# Patient Record
(demographics unavailable — no encounter records)

---

## 2024-10-10 NOTE — DATA REVIEWED
[MRI] : MRI [Lumbar Spine] : lumbar spine [Report was reviewed and noted in the chart] : The report was reviewed and noted in the chart [I independently reviewed and interpreted images and report] : I independently reviewed and interpreted images and report [FreeTextEntry1] : [facility] L Spine MRI 4/16/24 1. L5-S1 broad-based posterior disc extrusion with caudal extension contacting b/l S1, mild central stenosis and moderate foraminal stenosis.

## 2024-10-10 NOTE — IMAGING
[Scoliosis] : Scoliosis [No spinal deformity, fracture, lytic lesion, or marked single level collapse] : No spinal deformity, fracture, lytic lesion, or marked single level collapse [No instability seen on flexion/extension] : No instability seen on flexion/extension [AP] : anteroposterior [Mild arthritis (Tonnis Grade 1)] : Mild arthritis (Tonnis Grade 1) [de-identified] : L Spine Inspection: No defects or deformities Palpation: L lumbar spasms and tenderness ROM: stiffness, diminished in all planes - pain with flexion Strength: 5/5 b/l hip flexors, knee extensors, ankle dorsiflexors, EHL, ankle plantarflexors Neuro: Sensation LT I Negative b/l SLR Toe and heal walk intact Gait non antalgic L S1 dermatome of pain

## 2024-10-10 NOTE — ASSESSMENT
[FreeTextEntry1] : 26 y/o M with chronic low back pain and persistent left buttock and thigh pain. Hx of RA. MRI shows a left sided herniated disc contacting b/l S1 at L5-S1 and degenerative disc changes at L3-4,4-5. HE would like to continue with weight loss prior to pursuing surgery which we support. Encouraged keeping his core strong. We'll reinitiate PT for new HEP. Continue pain meds prn which are renewed today.   - If continued conservative measure are insufficient would plan for a discectomy at L5-S1. Do continue to believe L5-S1 is the main pain generator. f/u 6-8 weeks  The risks and benefits of surgery have been discussed. Risks include but are not limited to bleeding, infection, reaction to anesthesia, injury to blood vessels and nerves, malunion, nonunion, DVT, PE, necessity of repeat surgery, CSF leak/repair, chronic pain, loss of limb and death. The patient understands the risks and agrees with the surgical plan. All questions have been answered.

## 2024-10-10 NOTE — HISTORY OF PRESENT ILLNESS
[Lower back] : lower back [8] : 8 [Dull/Aching] : dull/aching [Radiating] : radiating [Constant] : constant [Nothing helps with pain getting better] : Nothing helps with pain getting better [de-identified] : 10/10/24: f/u lbp. continues with some L leg pain. Has been working on weight loss started wegovy. spasms and tightness in the back. taking hydrocodone prn   8/29/24: Follow up L Spine. Here to discuss surgical options.   8/8/24: continues with pain. here with new MRI  7/18/24: Follow Up- L Spine. Pain has gotten worse since LESI w/Carmen. Referred back here. Now pain is radiating down RLE to thigh. D/c PT due to worsening symptoms, completed 2 weeks.    5/23/24: Follow Up- L Spine. Gotten worse w/PT. Radiating down left buttocks to posterior thigh. Taking Tramadol, helps until effect wears off.   4/30/24: Follow up L Spine. Pain meds have been helping. Started PT yesterday. Pain continues to radiate into bl legs L>R. MRI review.   4/11/24: 26 yo M presenting today with chronic low back pain that started in adolescence, which has been progressively worsening since January. Reports symptoms worsen with bending and reaching motion aggravating left side of back causing tightness. Taking Robaxin and ibuprofen. Patient reports he went to ED yesterday due to severe pain. Patient reports having intermittent Left posterior thigh pain when elevating leg. Denies n/t.  Patient has been going to chiro with some improvement.   Saw Dr. Bailey in 2019; MRI L Spine completed @ [facility] at that time with following Congenital and degenerative changes causing spinal stenosis, completed PT with improvement.  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   PMHx: Bipolar, hypothyroid, inflammatory arthritis, Bariatric Sleeve 2019 >100lb weight loss, skin removal 2022 All: NKDA Occupation: [employer] through,   [] : no [FreeTextEntry5] : Follow Up L SPINE. No changes since last visit.  [FreeTextEntry7] : left leg

## 2024-11-18 NOTE — PHYSICAL EXAM
[General Appearance - Well Developed] : well developed [General Appearance - Well Nourished] : well nourished [de-identified] : buried penis, slightly reduced elasticity, excess skin over escutcheon, bilateral descended testicles

## 2024-11-18 NOTE — HISTORY OF PRESENT ILLNESS
[FreeTextEntry1] : 28-year-old male presenting for erectile dysfunction  History notable for lower back pain with congenital spinal stenosis, disc bulging lower lumbar/upper sacral with compression of nerve roots seen on MRI  Following with orthopedics.  Working on conservative management for now with plan for L5-S1 discectomy if no improvement    ED Duration: couple months   Erection rigidity w/o meds: 7/10 Therapies tried: just some otc meds  Testosterone 410 10/24 (PM)   Curvature: mild congenital Orgasm/ejaculation: able Libido: high   Taking nitrates for chest pain: no A1c: nondiabetic Tobacco/nicotine use: rarely cigars  Marijuana use: daily Drug use: no  Also split urinary stream Chronic and occurs all the time No sensation of incomplete emptying No hematuria or dysuria  PMH: mood disorder (on lamictal, lithium and prozac), obesity Relevant Surgical hx: bariatric sleeve surgery 2019 Blood thinner use: no

## 2024-11-19 NOTE — ASSESSMENT
[FreeTextEntry1] : 28M w congenital spinal stenosis and obesity (s/p bariatric surgery + abdominoplasty c/b suprapubic wound infection) presents for split stream and buried penis. His split stream is due to his very large meatus that collapses on itself when he voids. I explained that surgery to "tighten" the meatus would be very atypical, and he agreed that the risks outweighed the potential benefit. He does not have a true buried penis, and surgery to better expose the base of the penis would be considered cosmetic. Moreover, given his prior suprapubic wound infection that healed by secondary intent, this would be a somewhat complicated procedure.   For now, he will focus on medical management for his ED and PT for his spinal stenosis.

## 2024-11-19 NOTE — HISTORY OF PRESENT ILLNESS
[FreeTextEntry1] : 28M w congenital spinal stenosis and obesity (s/p bariatric surgery + abdominoplasty c/b suprapubic wound infection) presents for split stream and buried penis. He reports lifelong split stream, and he usually sits to urinate. He reports that his pubic fat pad covers the first inch of his penis, but it does not interfere w urination or sex.  Today, cystoscopy only notable for 30F urethral meatus. When he voids, the midportion of the meatus collapses on itself, creating 2 separate streams without obstruction.

## 2024-12-05 NOTE — HISTORY OF PRESENT ILLNESS
[Lower back] : lower back [8] : 8 [Dull/Aching] : dull/aching [Radiating] : radiating [Constant] : constant [Nothing helps with pain getting better] : Nothing helps with pain getting better [de-identified] : 12/5/24: Follow up L Spine. Attending PT with good improvement. No longer needs to take pan meds, taking Nsaids PRN.    10/10/24: f/u lbp. continues with some L leg pain. Has been working on weight loss started wegovy. spasms and tightness in the back. taking hydrocodone prn   8/29/24: Follow up L Spine. Here to discuss surgical options.   8/8/24: continues with pain. here with new MRI  7/18/24: Follow Up- L Spine. Pain has gotten worse since LESI w/Carmen. Referred back here. Now pain is radiating down RLE to thigh. D/c PT due to worsening symptoms, completed 2 weeks.    5/23/24: Follow Up- L Spine. Gotten worse w/PT. Radiating down left buttocks to posterior thigh. Taking Tramadol, helps until effect wears off.   4/30/24: Follow up L Spine. Pain meds have been helping. Started PT yesterday. Pain continues to radiate into bl legs L>R. MRI review.   4/11/24: 28 yo M presenting today with chronic low back pain that started in adolescence, which has been progressively worsening since January. Reports symptoms worsen with bending and reaching motion aggravating left side of back causing tightness. Taking Robaxin and ibuprofen. Patient reports he went to ED yesterday due to severe pain. Patient reports having intermittent Left posterior thigh pain when elevating leg. Denies n/t.  Patient has been going to chiro with some improvement.   Saw Dr. Bailey in 2019; MRI L Spine completed @ [facility] at that time with following Congenital and degenerative changes causing spinal stenosis, completed PT with improvement.  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   PMHx: Bipolar, hypothyroid, inflammatory arthritis, Bariatric Sleeve 2019 >100lb weight loss, skin removal 2022 All: NKDA Occupation: [employer],   [] : no [FreeTextEntry5] : Follow Up L SPINE. No changes since last visit.  [FreeTextEntry7] : left leg

## 2024-12-05 NOTE — IMAGING
[Scoliosis] : Scoliosis [No spinal deformity, fracture, lytic lesion, or marked single level collapse] : No spinal deformity, fracture, lytic lesion, or marked single level collapse [No instability seen on flexion/extension] : No instability seen on flexion/extension [AP] : anteroposterior [Mild arthritis (Tonnis Grade 1)] : Mild arthritis (Tonnis Grade 1) [de-identified] : L Spine Inspection: No defects or deformities Palpation: L lumbar spasms and tenderness ROM: stiffness, diminished in all planes - pain with flexion Strength: 5/5 b/l hip flexors, knee extensors, ankle dorsiflexors, EHL, ankle plantarflexors Neuro: Sensation LT I Negative b/l SLR Toe and heal walk intact Gait non antalgic L S1 dermatome of pain

## 2024-12-05 NOTE — ASSESSMENT
[FreeTextEntry1] : 28 y/o M with chronic low back pain and persistent left buttock and thigh pain. Hx of RA. MRI shows a left sided herniated disc contacting b/l S1 at L5-S1 and degenerative disc changes at L3-4,4-5. Sxs have responded well to PT and weight loss, taking Nsaids PRN.   - Will c/w PT and meds PRN - F/up as sxs dictate.   *If continued conservative measure are insufficient would plan for a discectomy at L5-S1. Do continue to believe L5-S1 is the main pain generator. f/u 6-8 weeks

## 2024-12-10 NOTE — HISTORY OF PRESENT ILLNESS
[FreeTextEntry1] : 28 year old male with ED  Hx notable for lower back pain with congenital spinal stenosis, disc bulging lower lumbar/upper sacral with compression of nerve roots seen on MRI  Following with orthopedics. Working on conservative management for now with plan for L5-S1 discectomy if no improvement   Testosterone 410 10/24  At last visit was started on daily taladafil  Erections rigid with the daily cialis.  Still decreased sensation  One week ago started to have dysuria and pain with ejaculation Started 1 week ago Someone foul smelling No fevers, no hx of UTIs No hematuria

## 2024-12-10 NOTE — ASSESSMENT
[FreeTextEntry1] : 28 year old male with ED and new-onset dysuria  - Continue daily cialis 5mg for ED - Can use 20mg as needed - Unfortunately, not much I can offer him in regards to decreased penile sensation. Likely related to his spinal cord pathology - UA/UCx/GC/Chlamydia testing - Empiric cipro sent - RTC 3 months

## 2025-02-21 NOTE — ASSESSMENT
[FreeTextEntry1] : Sxs have slightly worsened on the L side. Patient has been taking ibuprofen 3-4 days in a row periodically. Patient is scheduled to see a urologist and is c/w PT with 30 lbs weight loss. Pain is localized in the LB that travels down the coccyx.  - slow down on PT and switch to HEP and meds PRN - F/up as sxs dictate.   *If continued conservative measure are insufficient would plan for a discectomy at L5-S1. Do continue to believe L5-S1 is the main pain generator. f/u 6-8 weeks

## 2025-02-21 NOTE — IMAGING
[Scoliosis] : Scoliosis [No spinal deformity, fracture, lytic lesion, or marked single level collapse] : No spinal deformity, fracture, lytic lesion, or marked single level collapse [No instability seen on flexion/extension] : No instability seen on flexion/extension [AP] : anteroposterior [Mild arthritis (Tonnis Grade 1)] : Mild arthritis (Tonnis Grade 1) [de-identified] : L Spine Inspection: No defects or deformities Palpation: L lumbar spasms and tenderness ROM: stiffness, diminished in all planes - pain with flexion Strength: 5/5 b/l hip flexors, knee extensors, ankle dorsiflexors, EHL, ankle plantarflexors Neuro: Sensation LT I Negative b/l SLR Toe and heal walk intact Gait non antalgic L S1 dermatome of pain

## 2025-02-21 NOTE — HISTORY OF PRESENT ILLNESS
[Lower back] : lower back [8] : 8 [Dull/Aching] : dull/aching [Radiating] : radiating [Constant] : constant [Nothing helps with pain getting better] : Nothing helps with pain getting better [de-identified] : 2/20/25: f/u L spine. Sxs have slightly worsened on the L side. Patient has been taking ibuprofen 3-4 days in a row periodically. Patient is scheduled to see a urologist and is c/w PT with 30 lbs weight loss. Pain is localized in the LB that travels down the coccyx. Patient continues to have issues with bending over.  12/5/24: Follow up L Spine. Attending PT with good improvement. No longer needs to take pan meds, taking Nsaids PRN.    10/10/24: f/u lbp. continues with some L leg pain. Has been working on weight loss started wegovy. spasms and tightness in the back. taking hydrocodone prn   8/29/24: Follow up L Spine. Here to discuss surgical options.   8/8/24: continues with pain. here with new MRI  7/18/24: Follow Up- L Spine. Pain has gotten worse since CHRISTOPHEI w/Carmen. Referred back here. Now pain is radiating down RLE to thigh. D/c PT due to worsening symptoms, completed 2 weeks.    5/23/24: Follow Up- L Spine. Gotten worse w/PT. Radiating down left buttocks to posterior thigh. Taking Tramadol, helps until effect wears off.   4/30/24: Follow up L Spine. Pain meds have been helping. Started PT yesterday. Pain continues to radiate into bl legs L>R. MRI review.   4/11/24: 26 yo M presenting today with chronic low back pain that started in adolescence, which has been progressively worsening since January. Reports symptoms worsen with bending and reaching motion aggravating left side of back causing tightness. Taking Robaxin and ibuprofen. Patient reports he went to ED yesterday due to severe pain. Patient reports having intermittent Left posterior thigh pain when elevating leg. Denies n/t.  Patient has been going to chiro with some improvement.   Saw Dr. Bailey in 2019; MRI L Spine completed @ [facility] at that time with following Congenital and degenerative changes causing spinal stenosis, completed PT with improvement.  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   PMHx: Bipolar, hypothyroid, inflammatory arthritis, Bariatric Sleeve 2019 >100lb weight loss, skin removal 2022 All: NKDA Occupation: [employer],   [] : no [FreeTextEntry5] : Follow Up L SPINE. No changes since last visit.  [FreeTextEntry7] : left leg

## 2025-02-21 NOTE — IMAGING
[Scoliosis] : Scoliosis [No spinal deformity, fracture, lytic lesion, or marked single level collapse] : No spinal deformity, fracture, lytic lesion, or marked single level collapse [No instability seen on flexion/extension] : No instability seen on flexion/extension [AP] : anteroposterior [Mild arthritis (Tonnis Grade 1)] : Mild arthritis (Tonnis Grade 1) [de-identified] : L Spine Inspection: No defects or deformities Palpation: L lumbar spasms and tenderness ROM: stiffness, diminished in all planes - pain with flexion Strength: 5/5 b/l hip flexors, knee extensors, ankle dorsiflexors, EHL, ankle plantarflexors Neuro: Sensation LT I Negative b/l SLR Toe and heal walk intact Gait non antalgic L S1 dermatome of pain

## 2025-02-21 NOTE — HISTORY OF PRESENT ILLNESS
[Lower back] : lower back [8] : 8 [Dull/Aching] : dull/aching [Radiating] : radiating [Constant] : constant [Nothing helps with pain getting better] : Nothing helps with pain getting better [de-identified] : 2/20/25: f/u L spine. Sxs have slightly worsened on the L side. Patient has been taking ibuprofen 3-4 days in a row periodically. Patient is scheduled to see a urologist and is c/w PT with 30 lbs weight loss. Pain is localized in the LB that travels down the coccyx. Patient continues to have issues with bending over.  12/5/24: Follow up L Spine. Attending PT with good improvement. No longer needs to take pan meds, taking Nsaids PRN.    10/10/24: f/u lbp. continues with some L leg pain. Has been working on weight loss started wegovy. spasms and tightness in the back. taking hydrocodone prn   8/29/24: Follow up L Spine. Here to discuss surgical options.   8/8/24: continues with pain. here with new MRI  7/18/24: Follow Up- L Spine. Pain has gotten worse since CHRISTOPHEI w/Carmen. Referred back here. Now pain is radiating down RLE to thigh. D/c PT due to worsening symptoms, completed 2 weeks.    5/23/24: Follow Up- L Spine. Gotten worse w/PT. Radiating down left buttocks to posterior thigh. Taking Tramadol, helps until effect wears off.   4/30/24: Follow up L Spine. Pain meds have been helping. Started PT yesterday. Pain continues to radiate into bl legs L>R. MRI review.   4/11/24: 26 yo M presenting today with chronic low back pain that started in adolescence, which has been progressively worsening since January. Reports symptoms worsen with bending and reaching motion aggravating left side of back causing tightness. Taking Robaxin and ibuprofen. Patient reports he went to ED yesterday due to severe pain. Patient reports having intermittent Left posterior thigh pain when elevating leg. Denies n/t.  Patient has been going to chiro with some improvement.   Saw Dr. Bailey in 2019; MRI L Spine completed @ [facility] at that time with following Congenital and degenerative changes causing spinal stenosis, completed PT with improvement.  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   PMHx: Bipolar, hypothyroid, inflammatory arthritis, Bariatric Sleeve 2019 >100lb weight loss, skin removal 2022 All: NKDA Occupation: [employer],   [] : no [FreeTextEntry5] : Follow Up L SPINE. No changes since last visit.  [FreeTextEntry7] : left leg

## 2025-03-10 NOTE — ASSESSMENT
[FreeTextEntry1] : 28 year old male with ED and penile sensation changes Hx notable for lower back pain with congenital spinal stenosis, disc bulging lower lumbar/upper sacral with compression of nerve roots seen on MRI  Testosterone 410 10/24  - UA/UCx - Continue daily tadalafil 5mg and 20mg prn - Hopefully improvement in erectile function after spinal surgery - RTC 3 months

## 2025-03-10 NOTE — PHYSICAL EXAM
[Chaperone Present] : A chaperone was present in the examining room during all aspects of the physical examination [General Appearance - Well Developed] : well developed [General Appearance - Well Nourished] : well nourished [de-identified] : Normal glans and meatus [FreeTextEntry2] : Bret GAYLE

## 2025-03-10 NOTE — HISTORY OF PRESENT ILLNESS
[FreeTextEntry1] : 28 year old male with ED and penile sensation changes  Hx notable for lower back pain with congenital spinal stenosis, disc bulging lower lumbar/upper sacral with compression of nerve roots seen on MRI  Following with orthopedics. Working on conservative management for now with plan for L5-S1 discectomy if no improvement   Testosterone 410 10/24  Uses 5mg daily tadalafil with 20mg prn   At latst visit was given course of ciprofloxacin due to dysuria and pain with ejaculation  UA at that time with trace LE. UCx neg. Gonorrhea, chlamydia, trich negative   Still having ED Seems to be related to his spinal pain. Says when he takes meds for pain, his erections are improved Able to orgasm  Some pain at tip of penis with urination but says he does not hydrate well

## 2025-03-11 NOTE — IMAGING
[Scoliosis] : Scoliosis [No spinal deformity, fracture, lytic lesion, or marked single level collapse] : No spinal deformity, fracture, lytic lesion, or marked single level collapse [No instability seen on flexion/extension] : No instability seen on flexion/extension [AP] : anteroposterior [Mild arthritis (Tonnis Grade 1)] : Mild arthritis (Tonnis Grade 1) [de-identified] : L Spine Inspection: No defects or deformities Palpation: L lumbar spasms and tenderness ROM: stiffness, diminished in all planes - pain with flexion Strength: 5/5 b/l hip flexors, knee extensors, ankle dorsiflexors, EHL, ankle plantarflexors Neuro: Sensation LT I ++ left SLR + crossed SLR on the right side Toe and heel walk intact Gait non antalgic L S1 dermatome of pain

## 2025-03-11 NOTE — ASSESSMENT
[FreeTextEntry1] : 27 y/o with hx of RA & persisting L>R LE radiculopathy. MRI: L5-S1 disc extrusion with caudal extension impinging b/l L>R S1 root. Completed PT and tried meds, provided relief, however, presented severe SE. Sxs have worsened and become more constant. Despite time, formal PT, weight loss and multiple meds the pain has persisted and it is predominantly LLE radicular pain;  intermittent right side;  I proposed a left L5 S1 discectomy;  the L34 and L45 disc changes are leading to some central stenosis but in my opinion neither of those discs L345 are a main contributor to his pain;  the focal site of pathology,  left L5 S1 is the presumed cause for the majority of his symptoms and I think the only site where surgery could be justified;   - Reviewed surgical options: Do continue to believe L5-S1 is the main pain generator, despite L34,45 show some pathology (disc height and signal loss). Would plan for a discectomy at L5-S1 - Pat & his mom agree with plan and would like to pursue sx.  time based billin minutes all components including face to face time, reviewing studies, documentation  The risks and benefits of surgery have been discussed. Risks include but are not limited to bleeding, infection, reaction to anesthesia, injury to blood vessels and nerves, malunion, nonunion, DVT, PE, necessity of repeat surgery, CF leak/repair, chronic pain, loss of limb and death. The patient understands the risks and agrees with the surgical plan. All questions have been answered.

## 2025-03-11 NOTE — DATA REVIEWED
[MRI] : MRI [Lumbar Spine] : lumbar spine [Report was reviewed and noted in the chart] : The report was reviewed and noted in the chart [I independently reviewed and interpreted images and report] : I independently reviewed and interpreted images and report [FreeTextEntry1] : [facility] L Spine MRI 4/16/24 1. L5-S1 broad-based posterior disc extrusion with caudal extension contacting b/l L>R S1, mild central stenosis and moderate foraminal stenosis.

## 2025-03-11 NOTE — HISTORY OF PRESENT ILLNESS
[Lower back] : lower back [8] : 8 [Dull/Aching] : dull/aching [Radiating] : radiating [Constant] : constant [Nothing helps with pain getting better] : Nothing helps with pain getting better [de-identified] : 3/11/25: Follow up L Spine. Tried Gabapentin 300mg PM, helped with pain, however, presented with severe SE (muscle aches, drowsiness). C/o of lower back pain and numbness and tingling in L>R LE to feet. Interested in pursuing sx.   2/20/25: f/u L spine. Sxs have slightly worsened on the L side. Patient has been taking ibuprofen 3-4 days in a row periodically. Patient is scheduled to see a urologist and is c/w PT with 30 lbs weight loss. Pain is localized in the LB that travels down the coccyx. Patient continues to have issues with bending over.  12/5/24: Follow up L Spine. Attending PT with good improvement. No longer needs to take pan meds, taking Nsaids PRN.    10/10/24: f/u lbp. continues with some L leg pain. Has been working on weight loss started wegovy. spasms and tightness in the back. taking hydrocodone prn   8/29/24: Follow up L Spine. Here to discuss surgical options.   8/8/24: continues with pain. here with new MRI  7/18/24: Follow Up- L Spine. Pain has gotten worse since EVELYN w/Carmen. Referred back here. Now pain is radiating down RLE to thigh. D/c PT due to worsening symptoms, completed 2 weeks.    5/23/24: Follow Up- L Spine. Gotten worse w/PT. Radiating down left buttocks to posterior thigh. Taking Tramadol, helps until effect wears off.   4/30/24: Follow up L Spine. Pain meds have been helping. Started PT yesterday. Pain continues to radiate into bl legs L>R. MRI review.   4/11/24: 26 yo M presenting today with chronic low back pain that started in adolescence, which has been progressively worsening since January. Reports symptoms worsen with bending and reaching motion aggravating left side of back causing tightness. Taking Robaxin and ibuprofen. Patient reports he went to ED yesterday due to severe pain. Patient reports having intermittent Left posterior thigh pain when elevating leg. Denies n/t.  Patient has been going to chiro with some improvement.   Saw Dr. Bailey in 2019; MRI L Spine completed @ [facility] at that time with following Congenital and degenerative changes causing spinal stenosis, completed PT with improvement.  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   PMHx: Bipolar, hypothyroid, inflammatory arthritis, Bariatric Sleeve 2019 >100lb weight loss, skin removal 2022 All: NKDA Occupation: [employer], [employer]  Lexie Yee MD  Surgical Oncology  [address] [place] [id number]  Tel [phone]  Direct [phone]  Fax [phone]    BREAST AND AXILLA SURGERY POST-OP INSTRUCTIONS    1) Medications/pain management  • Restart all of your regular medications unless specifically told otherwise  • It is easier to control pain by taking medications before the pain becomes severe  • We recommend taking the following medications on a regular schedule:  • Tylenol 325mg 1-3 tabs every 6 hours for the first day and/or  • Ibuprofen 200mg 1-3 tabs every 6 hours (with food)  ? If your pain is still uncontrolled  • Oxycodone 5mg 1 tab every 3 to 4 hours as needed for breakthrough pain  • Do NOT drink or drive while taking oxycodone.  • You may experience constipation while taking oxycodone. Increasing your fluid intake or  taking an over-the-counter stool softener may help.  • Ice packs may also help to reduce pain and swelling. They can be used every 3-4 hours for ten minutes  at a time. Warm compresses are not to be used near the incision.    2) Incision and Dressing Care  • Wear your bra for the next 3-4 days, it will help minimize postoperative bleeding and swelling. You can  even wear the bra to bed at night.  • The outer clear dressing may be removed in 2 days; you can shower after removing.  • Under the dressing, your incision has both sutures and steri-strips (small, white, strips of tape) or  sutures and special skin glue  • The sutures will be absorbed by your skin tissue over time.  • Steri-strips will fall off by itself, or will be removed in the office. The skin glue will gradually wear away.  • When showering, you may use soap and water. Do not scrub. Do not soak in a bath. Pat dry.  • Note: if you received blue dye for lymph node surgery, you may have blue/green urine. This is normal  and will clear by itself.    3) Diet: Resume your regular diet as tolerated.    4) Activity  • Avoid strenuous activity, heavy lifting (> 15 pounds) and vigorous exercise until seen in follow-up.  • Walking and most daily activity may be resumed the day after surgery.  • Most people return to desk-type work in 1-2 days. This will depend on how your feel and what type of  work you do.    5) Follow-up Care  • Pathology results from your biopsy are usually available within 1 week  • You will receive your results by phone or at your follow-up visit  • If your follow-up appointment was not made prior to your surgery, please call the office to schedule an  appointment for 1-2 weeks after your surgery.    Call the office if:  • Pain is not relieved by medication  • Fever greater than 100.4F or chills  • Persistent bleeding or drainage from your incision  • Persistent swelling, or increasing redness of around the incision  • Any other questions or concerns [] : no [FreeTextEntry5] : Follow Up L SPINE. No changes since last visit.  [FreeTextEntry7] : left leg

## 2025-05-06 NOTE — IMAGING
[Scoliosis] : Scoliosis [No spinal deformity, fracture, lytic lesion, or marked single level collapse] : No spinal deformity, fracture, lytic lesion, or marked single level collapse [No instability seen on flexion/extension] : No instability seen on flexion/extension [AP] : anteroposterior [Mild arthritis (Tonnis Grade 1)] : Mild arthritis (Tonnis Grade 1)

## 2025-05-08 NOTE — PHYSICAL EXAM
[de-identified] : L Spine Inspection: Incision w/o signs of infection.  Palpation: No tenderness or spasms in b/l lumbar paraspinal musculature ROM: Full with stiffness Strength: 5/5 b/l hip flexors, knee extensors, ankle dorsiflexors, EHL, ankle plantarflexors Neuro: Sensation LT I Negative b/l SLR Toe and heal walk intact Gait non antalgic

## 2025-05-08 NOTE — HISTORY OF PRESENT ILLNESS
[Lower back] : lower back [8] : 8 [Dull/Aching] : dull/aching [Radiating] : radiating [Constant] : constant [Nothing helps with pain getting better] : Nothing helps with pain getting better [de-identified] : DOS: 4/23/25 LEFT DISCECTOMY L5-S1  5/6/25: PO#1. 2 weeks s/p L5-S1 left sided discectomy. Doing well. Denies f/c/s. No incision drainage.   3/11/25: Follow up L Spine. Tried Gabapentin 300mg PM, helped with pain, however, presented with severe SE (muscle aches, drowsiness). C/o of lower back pain and numbness and tingling in L>R LE to feet. Interested in pursuing sx.   2/20/25: f/u L spine. Sxs have slightly worsened on the L side. Patient has been taking ibuprofen 3-4 days in a row periodically. Patient is scheduled to see a urologist and is c/w PT with 30 lbs weight loss. Pain is localized in the LB that travels down the coccyx. Patient continues to have issues with bending over.  12/5/24: Follow up L Spine. Attending PT with good improvement. No longer needs to take pan meds, taking Nsaids PRN.    10/10/24: f/u lbp. continues with some L leg pain. Has been working on weight loss started wegovy. spasms and tightness in the back. taking hydrocodone prn   8/29/24: Follow up L Spine. Here to discuss surgical options.   8/8/24: continues with pain. here with new MRI  7/18/24: Follow Up- L Spine. Pain has gotten worse since LESI w/Carmen. Referred back here. Now pain is radiating down RLE to thigh. D/c PT due to worsening symptoms, completed 2 weeks.    5/23/24: Follow Up- L Spine. Gotten worse w/PT. Radiating down left buttocks to posterior thigh. Taking Tramadol, helps until effect wears off.   4/30/24: Follow up L Spine. Pain meds have been helping. Started PT yesterday. Pain continues to radiate into bl legs L>R. MRI review.   4/11/24: 26 yo M presenting today with chronic low back pain that started in adolescence, which has been progressively worsening since January. Reports symptoms worsen with bending and reaching motion aggravating left side of back causing tightness. Taking Robaxin and ibuprofen. Patient reports he went to ED yesterday due to severe pain. Patient reports having intermittent Left posterior thigh pain when elevating leg. Denies n/t.  Patient has been going to chiro with some improvement.   Saw Dr. Bailey in 2019; MRI L Spine completed @ [facility] at that time with following Congenital and degenerative changes causing spinal stenosis, completed PT with improvement.  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   PMHx: Bipolar, hypothyroid, inflammatory arthritis, Bariatric Sleeve 2019 >100lb weight loss, skin removal 2022 All: NKDA Occupation: [employer],   [] : no [FreeTextEntry5] : Follow Up L SPINE. No changes since last visit.  [FreeTextEntry7] : left leg

## 2025-05-08 NOTE — DATA REVIEWED
[MRI] : MRI [Lumbar Spine] : lumbar spine [Report was reviewed and noted in the chart] : The report was reviewed and noted in the chart [I independently reviewed and interpreted images and report] : I independently reviewed and interpreted images and report [FreeTextEntry1] : [facility] L Spine MRI 4/16/24 1. L5-S1 broad-based posterior disc extrusion with caudal extension contacting b/l L>R S1, mild central stenosis and moderate foraminal stenosis.  No

## 2025-05-08 NOTE — ASSESSMENT
[FreeTextEntry1] : PO#1. 2 weeks s/p L5-S1 discectomy. Incision clean w/o signs of infection. Preop sxs progressively improving. Taking pain meds sparingly. Advised to avoid heavy lifting for now. F/up in 1 month, will discuss starting PT at the time.

## 2025-05-08 NOTE — PHYSICAL EXAM
[de-identified] : L Spine Inspection: Incision w/o signs of infection.  Palpation: No tenderness or spasms in b/l lumbar paraspinal musculature ROM: Full with stiffness Strength: 5/5 b/l hip flexors, knee extensors, ankle dorsiflexors, EHL, ankle plantarflexors Neuro: Sensation LT I Negative b/l SLR Toe and heal walk intact Gait non antalgic

## 2025-05-08 NOTE — HISTORY OF PRESENT ILLNESS
[Lower back] : lower back [8] : 8 [Dull/Aching] : dull/aching [Radiating] : radiating [Constant] : constant [Nothing helps with pain getting better] : Nothing helps with pain getting better [de-identified] : DOS: 4/23/25 LEFT DISCECTOMY L5-S1  5/6/25: PO#1. 2 weeks s/p L5-S1 left sided discectomy. Doing well. Denies f/c/s. No incision drainage.   3/11/25: Follow up L Spine. Tried Gabapentin 300mg PM, helped with pain, however, presented with severe SE (muscle aches, drowsiness). C/o of lower back pain and numbness and tingling in L>R LE to feet. Interested in pursuing sx.   2/20/25: f/u L spine. Sxs have slightly worsened on the L side. Patient has been taking ibuprofen 3-4 days in a row periodically. Patient is scheduled to see a urologist and is c/w PT with 30 lbs weight loss. Pain is localized in the LB that travels down the coccyx. Patient continues to have issues with bending over.  12/5/24: Follow up L Spine. Attending PT with good improvement. No longer needs to take pan meds, taking Nsaids PRN.    10/10/24: f/u lbp. continues with some L leg pain. Has been working on weight loss started wegovy. spasms and tightness in the back. taking hydrocodone prn   8/29/24: Follow up L Spine. Here to discuss surgical options.   8/8/24: continues with pain. here with new MRI  7/18/24: Follow Up- L Spine. Pain has gotten worse since LESI w/Carmen. Referred back here. Now pain is radiating down RLE to thigh. D/c PT due to worsening symptoms, completed 2 weeks.    5/23/24: Follow Up- L Spine. Gotten worse w/PT. Radiating down left buttocks to posterior thigh. Taking Tramadol, helps until effect wears off.   4/30/24: Follow up L Spine. Pain meds have been helping. Started PT yesterday. Pain continues to radiate into bl legs L>R. MRI review.   4/11/24: 26 yo M presenting today with chronic low back pain that started in adolescence, which has been progressively worsening since January. Reports symptoms worsen with bending and reaching motion aggravating left side of back causing tightness. Taking Robaxin and ibuprofen. Patient reports he went to ED yesterday due to severe pain. Patient reports having intermittent Left posterior thigh pain when elevating leg. Denies n/t.  Patient has been going to chiro with some improvement.   Saw Dr. Bailey in 2019; MRI L Spine completed @ [facility] at that time with following Congenital and degenerative changes causing spinal stenosis, completed PT with improvement.  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   PMHx: Bipolar, hypothyroid, inflammatory arthritis, Bariatric Sleeve 2019 >100lb weight loss, skin removal 2022 All: NKDA Occupation: [employer],   [] : no [FreeTextEntry5] : Follow Up L SPINE. No changes since last visit.  [FreeTextEntry7] : left leg

## 2025-06-02 NOTE — HISTORY OF PRESENT ILLNESS
[Lower back] : lower back [8] : 8 [Dull/Aching] : dull/aching [Radiating] : radiating [Constant] : constant [Nothing helps with pain getting better] : Nothing helps with pain getting better [de-identified] : DOS: 4/23/25 LEFT DISCECTOMY L5-S1 6/2:  left calf swollen due to RA flare up;  rheum doctor reassured him;  resumed meds so that should settle down;  will return to the gym once the current RA flare up has subsided;   5/6/25: PO#1. 2 weeks s/p L5-S1 left sided discectomy. Doing well. Denies f/c/s. No incision drainage.   3/11/25: Follow up L Spine. Tried Gabapentin 300mg PM, helped with pain, however, presented with severe SE (muscle aches, drowsiness). C/o of lower back pain and numbness and tingling in L>R LE to feet. Interested in pursuing sx.   2/20/25: f/u L spine. Sxs have slightly worsened on the L side. Patient has been taking ibuprofen 3-4 days in a row periodically. Patient is scheduled to see a urologist and is c/w PT with 30 lbs weight loss. Pain is localized in the LB that travels down the coccyx. Patient continues to have issues with bending over.  12/5/24: Follow up L Spine. Attending PT with good improvement. No longer needs to take pan meds, taking Nsaids PRN.    10/10/24: f/u lbp. continues with some L leg pain. Has been working on weight loss started wegovy. spasms and tightness in the back. taking hydrocodone prn   8/29/24: Follow up L Spine. Here to discuss surgical options.   8/8/24: continues with pain. here with new MRI  7/18/24: Follow Up- L Spine. Pain has gotten worse since CHRISTOPHEI w/Carmen. Referred back here. Now pain is radiating down RLE to thigh. D/c PT due to worsening symptoms, completed 2 weeks.    5/23/24: Follow Up- L Spine. Gotten worse w/PT. Radiating down left buttocks to posterior thigh. Taking Tramadol, helps until effect wears off.   4/30/24: Follow up L Spine. Pain meds have been helping. Started PT yesterday. Pain continues to radiate into bl legs L>R. MRI review.   4/11/24: 26 yo M presenting today with chronic low back pain that started in adolescence, which has been progressively worsening since January. Reports symptoms worsen with bending and reaching motion aggravating left side of back causing tightness. Taking Robaxin and ibuprofen. Patient reports he went to ED yesterday due to severe pain. Patient reports having intermittent Left posterior thigh pain when elevating leg. Denies n/t.  Patient has been going to chiro with some improvement.   Saw Dr. Bailey in 2019; MRI L Spine completed @ [facility] at that time with following Congenital and degenerative changes causing spinal stenosis, completed PT with improvement.  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   PMHx: Bipolar, hypothyroid, inflammatory arthritis, Bariatric Sleeve 2019 >100lb weight loss, skin removal 2022 All: NKDA Occupation: [employer],   [] : no [FreeTextEntry5] : Follow Up L SPINE. No changes since last visit.  [FreeTextEntry7] : left leg

## 2025-06-02 NOTE — PHYSICAL EXAM
[de-identified] : L Spine Inspection: Incision w/o signs of infection.  Palpation: No tenderness or spasms in b/l lumbar paraspinal musculature ROM: Full with stiffness Strength: 5/5 b/l hip flexors, knee extensors, ankle dorsiflexors, EHL, ankle plantarflexors Neuro: Sensation LT I Negative b/l SLR Toe and heal walk intact Gait non antalgic